# Patient Record
Sex: MALE | Employment: FULL TIME | URBAN - METROPOLITAN AREA
[De-identification: names, ages, dates, MRNs, and addresses within clinical notes are randomized per-mention and may not be internally consistent; named-entity substitution may affect disease eponyms.]

---

## 2020-11-11 ENCOUNTER — TELEPHONE (OUTPATIENT)
Dept: ENDOCRINOLOGY CLINIC | Facility: CLINIC | Age: 45
End: 2020-11-11

## 2020-11-11 ENCOUNTER — OFFICE VISIT (OUTPATIENT)
Dept: ENDOCRINOLOGY CLINIC | Facility: CLINIC | Age: 45
End: 2020-11-11
Payer: COMMERCIAL

## 2020-11-11 VITALS
DIASTOLIC BLOOD PRESSURE: 90 MMHG | HEIGHT: 72 IN | HEART RATE: 74 BPM | BODY MASS INDEX: 31.15 KG/M2 | WEIGHT: 230 LBS | SYSTOLIC BLOOD PRESSURE: 126 MMHG | RESPIRATION RATE: 18 BRPM

## 2020-11-11 DIAGNOSIS — E66.09 CLASS 1 OBESITY DUE TO EXCESS CALORIES WITH SERIOUS COMORBIDITY AND BODY MASS INDEX (BMI) OF 30.0 TO 30.9 IN ADULT: ICD-10-CM

## 2020-11-11 DIAGNOSIS — E11.65 TYPE 2 DIABETES MELLITUS WITH HYPERGLYCEMIA, WITHOUT LONG-TERM CURRENT USE OF INSULIN (HCC): Primary | ICD-10-CM

## 2020-11-11 PROCEDURE — 3074F SYST BP LT 130 MM HG: CPT | Performed by: INTERNAL MEDICINE

## 2020-11-11 PROCEDURE — 83036 HEMOGLOBIN GLYCOSYLATED A1C: CPT | Performed by: INTERNAL MEDICINE

## 2020-11-11 PROCEDURE — 82962 GLUCOSE BLOOD TEST: CPT | Performed by: INTERNAL MEDICINE

## 2020-11-11 PROCEDURE — 3008F BODY MASS INDEX DOCD: CPT | Performed by: INTERNAL MEDICINE

## 2020-11-11 PROCEDURE — 99203 OFFICE O/P NEW LOW 30 MIN: CPT | Performed by: INTERNAL MEDICINE

## 2020-11-11 PROCEDURE — 90686 IIV4 VACC NO PRSV 0.5 ML IM: CPT | Performed by: INTERNAL MEDICINE

## 2020-11-11 PROCEDURE — 99072 ADDL SUPL MATRL&STAF TM PHE: CPT | Performed by: INTERNAL MEDICINE

## 2020-11-11 PROCEDURE — 90471 IMMUNIZATION ADMIN: CPT | Performed by: INTERNAL MEDICINE

## 2020-11-11 PROCEDURE — 3080F DIAST BP >= 90 MM HG: CPT | Performed by: INTERNAL MEDICINE

## 2020-11-11 PROCEDURE — 36416 COLLJ CAPILLARY BLOOD SPEC: CPT | Performed by: INTERNAL MEDICINE

## 2020-11-11 RX ORDER — LANCETS 30 GAUGE
1 EACH MISCELLANEOUS 2 TIMES DAILY
Qty: 1 EACH | Refills: 1 | Status: SHIPPED | OUTPATIENT
Start: 2020-11-11

## 2020-11-11 RX ORDER — BLOOD SUGAR DIAGNOSTIC
STRIP MISCELLANEOUS
Qty: 100 STRIP | Refills: 0 | Status: SHIPPED | OUTPATIENT
Start: 2020-11-11 | End: 2020-12-30

## 2020-11-11 RX ORDER — BLOOD-GLUCOSE METER
1 EACH MISCELLANEOUS 2 TIMES DAILY
Qty: 1 KIT | Refills: 0 | Status: SHIPPED | OUTPATIENT
Start: 2020-11-11

## 2020-11-11 RX ORDER — ATORVASTATIN CALCIUM 10 MG/1
10 TABLET, FILM COATED ORAL NIGHTLY
COMMUNITY
End: 2022-01-23

## 2020-11-11 NOTE — TELEPHONE ENCOUNTER
Please call patient and let him know that there is a referral in his chart to make an appointment with an ophthalmologist. Thank you!

## 2020-11-11 NOTE — H&P
Name: Daryl Zuleta  Date: 11/11/2020    Referring Physician: [address] [place] [address],[address]   Daryl Zuleta is a 39year old male who presents for evaluation and management of uncontrolled T2D.  He was diagnosed on blood work, having p Smoking status: Never Smoker      Smokeless tobacco: Never Used    Alcohol use: Yes      Frequency: Monthly or less      Drinks per session: 1 or 2    Drug use: Never      Medical History:   Past Medical History:   Diagnosis Date   • Diabetes ([facility] of DM.     1.) Hyperglycemia Management- We discussed the importance of glycemic control to prevent complications of diabetes. We discussed the importance of SBGM. I offered and provided patient education materials and offered a blood glucose log book.    -

## 2020-11-11 NOTE — TELEPHONE ENCOUNTER
Pharmacy comments: PLEASE SEND RX FOR ACCU-CHEK SOFTCLIX LANCETS FOR PATIENT ELIZABETH Rendon [id number]. :[id number].

## 2020-11-12 RX ORDER — LANCETS
EACH MISCELLANEOUS
Qty: 100 EACH | Refills: 1 | Status: SHIPPED | OUTPATIENT
Start: 2020-11-12

## 2020-11-13 ENCOUNTER — TELEPHONE (OUTPATIENT)
Dept: ENDOCRINOLOGY CLINIC | Facility: CLINIC | Age: 45
End: 2020-11-13

## 2020-11-13 RX ORDER — BLOOD SUGAR DIAGNOSTIC
STRIP MISCELLANEOUS
Qty: 200 STRIP | Refills: 1 | Status: SHIPPED | OUTPATIENT
Start: 2020-11-13

## 2020-11-13 RX ORDER — LANCETS 33 GAUGE
EACH MISCELLANEOUS
Qty: 200 EACH | Refills: 1 | Status: SHIPPED | OUTPATIENT
Start: 2020-11-13

## 2020-11-13 RX ORDER — BLOOD-GLUCOSE METER
EACH MISCELLANEOUS
Qty: 1 KIT | Refills: 0 | Status: SHIPPED | OUTPATIENT
Start: 2020-11-13

## 2020-11-13 NOTE — TELEPHONE ENCOUNTER
[employer]old[employer] Bender is a certain employer but mail order service is through [employer].   RN substituted brand to the preferred per protocol

## 2020-11-13 NOTE — TELEPHONE ENCOUNTER
Livingston An form [email or web address] states that the scripts   Glucose Blood (ACCU-CHEK GUIDE) In Vitro Strip 100 strip 0 11/11/2020    Sig:   Please check blood sugars twice a day, fasting and before either lunch or dinner     Route:   (none)     Note to Pharmacy

## 2020-11-16 ENCOUNTER — APPOINTMENT (OUTPATIENT)
Dept: LAB | Facility: HOSPITAL | Age: 45
End: 2020-11-16
Attending: INTERNAL MEDICINE
Payer: COMMERCIAL

## 2020-11-16 DIAGNOSIS — E11.65 TYPE 2 DIABETES MELLITUS WITH HYPERGLYCEMIA, WITHOUT LONG-TERM CURRENT USE OF INSULIN (HCC): ICD-10-CM

## 2020-11-16 DIAGNOSIS — E66.09 CLASS 1 OBESITY DUE TO EXCESS CALORIES WITH SERIOUS COMORBIDITY AND BODY MASS INDEX (BMI) OF 30.0 TO 30.9 IN ADULT: ICD-10-CM

## 2020-11-16 PROCEDURE — 84439 ASSAY OF FREE THYROXINE: CPT

## 2020-11-16 PROCEDURE — 82570 ASSAY OF URINE CREATININE: CPT

## 2020-11-16 PROCEDURE — 84443 ASSAY THYROID STIM HORMONE: CPT

## 2020-11-16 PROCEDURE — 82043 UR ALBUMIN QUANTITATIVE: CPT

## 2020-11-16 PROCEDURE — 80053 COMPREHEN METABOLIC PANEL: CPT

## 2020-11-16 PROCEDURE — 80061 LIPID PANEL: CPT

## 2020-11-16 PROCEDURE — 36415 COLL VENOUS BLD VENIPUNCTURE: CPT

## 2020-11-29 ENCOUNTER — PATIENT MESSAGE (OUTPATIENT)
Dept: ENDOCRINOLOGY CLINIC | Facility: CLINIC | Age: 45
End: 2020-11-29

## 2020-11-30 NOTE — TELEPHONE ENCOUNTER
From: Kortney Ocampo  To: Ashlyn rBown MD  Sent: 11/29/2020 9:46 PM CST  Subject: Test Results Question    I have a question about Glycosylated hemoglobin test resulted on 11/11/20 at 10:50 AM.      Doctor,    Can you please email me these resul

## 2020-12-09 ENCOUNTER — PATIENT MESSAGE (OUTPATIENT)
Dept: ENDOCRINOLOGY CLINIC | Facility: CLINIC | Age: 45
End: 2020-12-09

## 2020-12-10 NOTE — TELEPHONE ENCOUNTER
From: Grazyna Mcdowell  To: Ashlyn Moore MD  Sent: 12/9/2020 10:45 AM CST  Subject: Other    Dr. Beulah Gamez if you can send my results to Chantel [email or web address]. [email or web address]       Very Best.

## 2020-12-30 DIAGNOSIS — E11.65 TYPE 2 DIABETES MELLITUS WITH HYPERGLYCEMIA, WITHOUT LONG-TERM CURRENT USE OF INSULIN (HCC): ICD-10-CM

## 2020-12-30 RX ORDER — BLOOD SUGAR DIAGNOSTIC
STRIP MISCELLANEOUS
Qty: 100 STRIP | Refills: 1 | Status: SHIPPED | OUTPATIENT
Start: 2020-12-30

## 2021-04-12 ENCOUNTER — PATIENT MESSAGE (OUTPATIENT)
Dept: ENDOCRINOLOGY CLINIC | Facility: CLINIC | Age: 46
End: 2021-04-12

## 2021-04-13 NOTE — TELEPHONE ENCOUNTER
From: Linnette Jaimes  To: Ashlyn Torres MD  Sent: 4/12/2021 7:16 PM CDT  Subject: Prescription Question    Good evening Doc. Looking to update my new insurance, I have scheduled a new appointment to see you on 5-8-21.   I also need a refill on

## 2021-05-08 ENCOUNTER — OFFICE VISIT (OUTPATIENT)
Dept: ENDOCRINOLOGY CLINIC | Facility: CLINIC | Age: 46
End: 2021-05-08
Payer: COMMERCIAL

## 2021-05-08 VITALS
HEART RATE: 80 BPM | BODY MASS INDEX: 31.69 KG/M2 | WEIGHT: 234 LBS | RESPIRATION RATE: 18 BRPM | DIASTOLIC BLOOD PRESSURE: 86 MMHG | HEIGHT: 72 IN | SYSTOLIC BLOOD PRESSURE: 138 MMHG

## 2021-05-08 DIAGNOSIS — E78.5 HYPERLIPIDEMIA, UNSPECIFIED HYPERLIPIDEMIA TYPE: ICD-10-CM

## 2021-05-08 DIAGNOSIS — E66.09 CLASS 1 OBESITY DUE TO EXCESS CALORIES WITH SERIOUS COMORBIDITY AND BODY MASS INDEX (BMI) OF 30.0 TO 30.9 IN ADULT: ICD-10-CM

## 2021-05-08 DIAGNOSIS — E11.65 TYPE 2 DIABETES MELLITUS WITH HYPERGLYCEMIA, WITHOUT LONG-TERM CURRENT USE OF INSULIN (HCC): Primary | ICD-10-CM

## 2021-05-08 PROCEDURE — 36416 COLLJ CAPILLARY BLOOD SPEC: CPT | Performed by: INTERNAL MEDICINE

## 2021-05-08 PROCEDURE — 99214 OFFICE O/P EST MOD 30 MIN: CPT | Performed by: INTERNAL MEDICINE

## 2021-05-08 PROCEDURE — 83036 HEMOGLOBIN GLYCOSYLATED A1C: CPT | Performed by: INTERNAL MEDICINE

## 2021-05-08 PROCEDURE — 3008F BODY MASS INDEX DOCD: CPT | Performed by: INTERNAL MEDICINE

## 2021-05-08 PROCEDURE — 3075F SYST BP GE 130 - 139MM HG: CPT | Performed by: INTERNAL MEDICINE

## 2021-05-08 PROCEDURE — 82947 ASSAY GLUCOSE BLOOD QUANT: CPT | Performed by: INTERNAL MEDICINE

## 2021-05-08 PROCEDURE — 3079F DIAST BP 80-89 MM HG: CPT | Performed by: INTERNAL MEDICINE

## 2021-05-08 NOTE — PROGRESS NOTES
Name: Quentin Clark  Date: 5/08/21    Referring Physician: Self-referral    INITIAL VISIT:   Quentin Clark is a 39year old male who presents for follow-up of evaluation and management of uncontrolled T2D.  He was diagnosed on blood work, having edith w/Device Does not apply Kit, Check blood sugar twice a day, fasting and before either lunch or dinner, Disp: 1 kit, Rfl: 0  •  Glucose Blood (ONETOUCH VERIO) In Vitro Strip, Check blood sugar twice a day, fasting and before either lunch or dinner, Disp: 20 11/16/2020    ANIONGAP 3 11/16/2020    GFRNAA 95 11/16/2020    GFRAA 110 11/16/2020    CA 8.9 11/16/2020    OSMOCALC 295 11/16/2020    ALKPHO 116 11/16/2020    AST 22 11/16/2020    ALT 37 11/16/2020    BILT 0.6 11/16/2020    TP 7.3 11/16/2020    ALB 3.7 11 continue to be active    Return to clinic in 3 months    Prior to this encounter, I spent over 10 minutes with preparing for the visit, including reviewing documents from other specialties as well as from PCP and going over test results.  During the face to

## 2021-09-28 ENCOUNTER — OFFICE VISIT (OUTPATIENT)
Dept: OPHTHALMOLOGY | Facility: CLINIC | Age: 46
End: 2021-09-28
Payer: COMMERCIAL

## 2021-09-28 DIAGNOSIS — E11.9 TYPE 2 DIABETES MELLITUS WITHOUT RETINOPATHY (HCC): Primary | ICD-10-CM

## 2021-09-28 DIAGNOSIS — H52.13 MYOPIA OF BOTH EYES WITH REGULAR ASTIGMATISM: ICD-10-CM

## 2021-09-28 DIAGNOSIS — H52.223 MYOPIA OF BOTH EYES WITH REGULAR ASTIGMATISM: ICD-10-CM

## 2021-09-28 PROCEDURE — 92015 DETERMINE REFRACTIVE STATE: CPT | Performed by: OPHTHALMOLOGY

## 2021-09-28 PROCEDURE — 92004 COMPRE OPH EXAM NEW PT 1/>: CPT | Performed by: OPHTHALMOLOGY

## 2021-09-28 RX ORDER — METFORMIN HYDROCHLORIDE 500 MG/1
2 TABLET, EXTENDED RELEASE ORAL 2 TIMES DAILY
COMMUNITY
Start: 2019-12-13 | End: 2021-10-13

## 2021-09-28 NOTE — PROGRESS NOTES
Viviana Valdivia is a 42year old male.     HPI:     HPI     Consult      Additional comments: Consult per Dr. Diana Gunderson              Diabetic Eye Exam      Additional comments: Pt has been a diabetic for 2 1/2 years       Pt's diabetes is currently contr and before either lunch or dinner 200 strip 1   • OneTouch Delica Lancets 24A Does not apply Misc Check blood sugar twice a day, fasting and before either lunch or dinner 200 each 1   • Accu-Chek Softclix Lancets Does not apply Misc Use to check blood suga Vitreous Clear Clear          Fundus Exam       Right Left    Disc Good rim, Temporal crescent Good rim, Temporal crescent    C/D Ratio 0.35 0.5    Macula Normal- no BDR Normal- no BDR    Vessels Normal Normal    Periphery Normal Normal            Refra

## 2021-09-28 NOTE — PATIENT INSTRUCTIONS
Myopia of both eyes with regular astigmatism  New glasses today; update as needed. Discussed that new prescription is only a slight change.        Type 2 diabetes mellitus without retinopathy ([address]  Diabetes type II: no background of retinopathy, no signs

## 2021-10-09 NOTE — TELEPHONE ENCOUNTER
From: Viviana Valdivia  To: Ashlyn Gunderson MD  Sent: 12/9/2020 10:09 AM CST  Subject: Other    Dr.    How can I get my test results printed, I need them for a new job posting.  I received an email from a nurse telling me to go into a portal but did n
rn sent mychart letting him know to open [employer] thru a laptop , desktop to try pinting or take a picture of result and print that.
Left arm;

## 2021-10-13 ENCOUNTER — PATIENT MESSAGE (OUTPATIENT)
Dept: ENDOCRINOLOGY CLINIC | Facility: CLINIC | Age: 46
End: 2021-10-13

## 2021-10-25 ENCOUNTER — PATIENT MESSAGE (OUTPATIENT)
Dept: ENDOCRINOLOGY CLINIC | Facility: CLINIC | Age: 46
End: 2021-10-25

## 2021-10-25 NOTE — TELEPHONE ENCOUNTER
From: Katharina Negron  To: Ashlyn Sterling MD  Sent: 10/25/2021 9:25 AM CDT  Subject: Prescription    Doc,  I believe they sent the last medication to the wrong pharmacy, can you please see that all medication be sent over to [facility] on [address]

## 2021-10-26 ENCOUNTER — LAB ENCOUNTER (OUTPATIENT)
Dept: LAB | Facility: HOSPITAL | Age: 46
End: 2021-10-26
Attending: INTERNAL MEDICINE
Payer: COMMERCIAL

## 2021-10-26 DIAGNOSIS — E11.65 TYPE 2 DIABETES MELLITUS WITH HYPERGLYCEMIA, WITHOUT LONG-TERM CURRENT USE OF INSULIN (HCC): ICD-10-CM

## 2021-10-26 PROCEDURE — 80053 COMPREHEN METABOLIC PANEL: CPT

## 2021-10-26 PROCEDURE — 80061 LIPID PANEL: CPT

## 2021-10-26 PROCEDURE — 36415 COLL VENOUS BLD VENIPUNCTURE: CPT

## 2021-10-26 PROCEDURE — 82043 UR ALBUMIN QUANTITATIVE: CPT

## 2021-10-26 PROCEDURE — 82570 ASSAY OF URINE CREATININE: CPT

## 2021-10-27 ENCOUNTER — PATIENT MESSAGE (OUTPATIENT)
Dept: ENDOCRINOLOGY CLINIC | Facility: CLINIC | Age: 46
End: 2021-10-27

## 2021-10-27 DIAGNOSIS — E11.65 TYPE 2 DIABETES MELLITUS WITH HYPERGLYCEMIA, WITHOUT LONG-TERM CURRENT USE OF INSULIN (HCC): Primary | ICD-10-CM

## 2021-10-27 DIAGNOSIS — E78.5 HYPERLIPIDEMIA, UNSPECIFIED HYPERLIPIDEMIA TYPE: ICD-10-CM

## 2021-10-28 NOTE — TELEPHONE ENCOUNTER
From: Garry Kellogg  To: Ashlyn Mejias MD  Sent: 10/27/2021 6:13 PM CDT  Subject: Hi Doctor Destiny Mejias,     Just wanted to know your thoughts on my numbers, I see my best down fall has been my Cholesterol numbers.  What should I do to help normal

## 2021-11-04 NOTE — TELEPHONE ENCOUNTER
Hi!    Can we please ask Mr. Orville Bajwa if he was fasting when he had his tests done? Please let him know that this affects his number greatly.   Cutting down on fatty foods as well as processed sugars will really help to improve his lipid profile as well as

## 2022-01-15 ENCOUNTER — LAB ENCOUNTER (OUTPATIENT)
Dept: LAB | Facility: HOSPITAL | Age: 47
End: 2022-01-15
Attending: INTERNAL MEDICINE
Payer: COMMERCIAL

## 2022-01-15 ENCOUNTER — OFFICE VISIT (OUTPATIENT)
Dept: ENDOCRINOLOGY CLINIC | Facility: CLINIC | Age: 47
End: 2022-01-15
Payer: COMMERCIAL

## 2022-01-15 VITALS
HEIGHT: 72 IN | RESPIRATION RATE: 18 BRPM | DIASTOLIC BLOOD PRESSURE: 85 MMHG | HEART RATE: 81 BPM | WEIGHT: 232 LBS | BODY MASS INDEX: 31.42 KG/M2 | SYSTOLIC BLOOD PRESSURE: 131 MMHG

## 2022-01-15 DIAGNOSIS — E78.5 HYPERLIPIDEMIA, UNSPECIFIED HYPERLIPIDEMIA TYPE: ICD-10-CM

## 2022-01-15 DIAGNOSIS — G62.9 NEUROPATHY: ICD-10-CM

## 2022-01-15 DIAGNOSIS — E11.65 TYPE 2 DIABETES MELLITUS WITH HYPERGLYCEMIA, WITHOUT LONG-TERM CURRENT USE OF INSULIN (HCC): ICD-10-CM

## 2022-01-15 DIAGNOSIS — E11.65 TYPE 2 DIABETES MELLITUS WITH HYPERGLYCEMIA, WITHOUT LONG-TERM CURRENT USE OF INSULIN (HCC): Primary | ICD-10-CM

## 2022-01-15 DIAGNOSIS — E66.09 CLASS 1 OBESITY DUE TO EXCESS CALORIES WITH SERIOUS COMORBIDITY AND BODY MASS INDEX (BMI) OF 31.0 TO 31.9 IN ADULT: ICD-10-CM

## 2022-01-15 LAB
ALBUMIN SERPL-MCNC: 3.9 G/DL (ref 3.4–5)
ALBUMIN/GLOB SERPL: 1.1 {RATIO} (ref 1–2)
ALP LIVER SERPL-CCNC: 87 U/L
ALT SERPL-CCNC: 49 U/L
ANION GAP SERPL CALC-SCNC: 4 MMOL/L (ref 0–18)
AST SERPL-CCNC: 24 U/L (ref 15–37)
BILIRUB SERPL-MCNC: 0.6 MG/DL (ref 0.1–2)
BUN BLD-MCNC: 10 MG/DL (ref 7–18)
BUN/CREAT SERPL: 9.6 (ref 10–20)
CALCIUM BLD-MCNC: 9.2 MG/DL (ref 8.5–10.1)
CARTRIDGE LOT#: ABNORMAL NUMERIC
CHLORIDE SERPL-SCNC: 106 MMOL/L (ref 98–112)
CHOLEST SERPL-MCNC: 159 MG/DL (ref ?–200)
CO2 SERPL-SCNC: 29 MMOL/L (ref 21–32)
CREAT BLD-MCNC: 1.04 MG/DL
CREAT UR-SCNC: 172 MG/DL
FASTING PATIENT LIPID ANSWER: YES
FASTING STATUS PATIENT QL REPORTED: YES
GLOBULIN PLAS-MCNC: 3.4 G/DL (ref 2.8–4.4)
GLUCOSE BLD-MCNC: 147 MG/DL (ref 70–99)
GLUCOSE BLOOD: 170
HDLC SERPL-MCNC: 34 MG/DL (ref 40–59)
HEMOGLOBIN A1C: 6.7 % (ref 4.3–5.6)
LDLC SERPL CALC-MCNC: 101 MG/DL (ref ?–100)
MICROALBUMIN UR-MCNC: 0.59 MG/DL
MICROALBUMIN/CREAT 24H UR-RTO: 3.4 UG/MG (ref ?–30)
NONHDLC SERPL-MCNC: 125 MG/DL (ref ?–130)
OSMOLALITY SERPL CALC.SUM OF ELEC: 290 MOSM/KG (ref 275–295)
POTASSIUM SERPL-SCNC: 4.3 MMOL/L (ref 3.5–5.1)
PROT SERPL-MCNC: 7.3 G/DL (ref 6.4–8.2)
SODIUM SERPL-SCNC: 139 MMOL/L (ref 136–145)
T4 FREE SERPL-MCNC: 1.1 NG/DL (ref 0.8–1.7)
TEST STRIP LOT #: NORMAL NUMERIC
TRIGL SERPL-MCNC: 131 MG/DL (ref 30–149)
TSI SER-ACNC: 1.55 MIU/ML (ref 0.36–3.74)
VIT B12 SERPL-MCNC: 376 PG/ML (ref 193–986)
VIT D+METAB SERPL-MCNC: 14.7 NG/ML (ref 30–100)
VLDLC SERPL CALC-MCNC: 22 MG/DL (ref 0–30)

## 2022-01-15 PROCEDURE — 3079F DIAST BP 80-89 MM HG: CPT | Performed by: INTERNAL MEDICINE

## 2022-01-15 PROCEDURE — 99214 OFFICE O/P EST MOD 30 MIN: CPT | Performed by: INTERNAL MEDICINE

## 2022-01-15 PROCEDURE — 36416 COLLJ CAPILLARY BLOOD SPEC: CPT | Performed by: INTERNAL MEDICINE

## 2022-01-15 PROCEDURE — 83036 HEMOGLOBIN GLYCOSYLATED A1C: CPT | Performed by: INTERNAL MEDICINE

## 2022-01-15 PROCEDURE — 3075F SYST BP GE 130 - 139MM HG: CPT | Performed by: INTERNAL MEDICINE

## 2022-01-15 PROCEDURE — 82947 ASSAY GLUCOSE BLOOD QUANT: CPT | Performed by: INTERNAL MEDICINE

## 2022-01-15 PROCEDURE — 80053 COMPREHEN METABOLIC PANEL: CPT

## 2022-01-15 PROCEDURE — 84443 ASSAY THYROID STIM HORMONE: CPT

## 2022-01-15 PROCEDURE — 82607 VITAMIN B-12: CPT

## 2022-01-15 PROCEDURE — 3044F HG A1C LEVEL LT 7.0%: CPT | Performed by: INTERNAL MEDICINE

## 2022-01-15 PROCEDURE — 36415 COLL VENOUS BLD VENIPUNCTURE: CPT

## 2022-01-15 PROCEDURE — 82570 ASSAY OF URINE CREATININE: CPT

## 2022-01-15 PROCEDURE — 3008F BODY MASS INDEX DOCD: CPT | Performed by: INTERNAL MEDICINE

## 2022-01-15 PROCEDURE — 82043 UR ALBUMIN QUANTITATIVE: CPT

## 2022-01-15 PROCEDURE — 84439 ASSAY OF FREE THYROXINE: CPT

## 2022-01-15 PROCEDURE — 82306 VITAMIN D 25 HYDROXY: CPT

## 2022-01-15 PROCEDURE — 80061 LIPID PANEL: CPT

## 2022-01-15 RX ORDER — LANCETS 33 GAUGE
1 EACH MISCELLANEOUS 3 TIMES DAILY
Qty: 300 EACH | Refills: 0 | Status: CANCELLED | OUTPATIENT
Start: 2022-01-15

## 2022-01-15 RX ORDER — BLOOD-GLUCOSE METER
EACH MISCELLANEOUS
Qty: 1 KIT | Refills: 0 | Status: CANCELLED | OUTPATIENT
Start: 2022-01-15

## 2022-01-15 RX ORDER — BLOOD SUGAR DIAGNOSTIC
STRIP MISCELLANEOUS
Qty: 300 EACH | Refills: 0 | Status: CANCELLED | OUTPATIENT
Start: 2022-01-15

## 2022-01-15 RX ORDER — FLASH GLUCOSE SENSOR
1 KIT MISCELLANEOUS
Qty: 6 EACH | Refills: 0 | OUTPATIENT
Start: 2022-01-15

## 2022-01-15 NOTE — PROGRESS NOTES
Name: Adams Wilson  Date: 1/15/22    Referring Physician: Self-referral    INITIAL VISIT:   Adams Wilson is a 42year old male who presents for follow-up of evaluation and management of uncontrolled T2D.  He was diagnosed on blood work, having edith 2 TABLETS (1,000 MG TOTAL) BY MOUTH 2 (TWO) TIMES DAILY WITH MEALS., Disp: 360 tablet, Rfl: 0  •  Glucose Blood (ACCU-CHEK GUIDE) In Vitro Strip, PLEASE CHECK BLOOD SUGARS TWICE DAILY, FASTING AND BEFORE EITHER LUNCH OR DINNER, Disp: 100 strip, Rfl: 1  • sclera. , normal sclera and normal pupils  Psychiatric:  oriented to time, self, and place  Nutritional:  no abnormal weight gain or loss    Lab Data: None  Lab Results   Component Value Date    A1C 8.0 (A) 05/08/2021     Lab Results   Component Value Date after recheck, not on meds  - Lipid panel- will check now  - MAC- will check now  - CMP- will check now  - Neuropathy- he has numbness from time to time, will check vitamin D, TSH, FT4, vitamin B12    3.) Lifestyle Management for Diabetes- We discussed imp

## 2022-01-23 ENCOUNTER — TELEPHONE (OUTPATIENT)
Dept: ENDOCRINOLOGY CLINIC | Facility: CLINIC | Age: 47
End: 2022-01-23

## 2022-01-23 DIAGNOSIS — E78.5 HYPERLIPIDEMIA, UNSPECIFIED HYPERLIPIDEMIA TYPE: ICD-10-CM

## 2022-01-23 DIAGNOSIS — E55.9 VITAMIN D DEFICIENCY: Primary | ICD-10-CM

## 2022-01-23 RX ORDER — ERGOCALCIFEROL 1.25 MG/1
50000 CAPSULE ORAL WEEKLY
Qty: 12 CAPSULE | Refills: 0 | Status: SHIPPED | OUTPATIENT
Start: 2022-01-23 | End: 2022-04-23

## 2022-01-23 RX ORDER — ATORVASTATIN CALCIUM 20 MG/1
20 TABLET, FILM COATED ORAL NIGHTLY
Qty: 90 TABLET | Refills: 0 | Status: SHIPPED | OUTPATIENT
Start: 2022-01-23 | End: 2022-04-23

## 2022-01-24 NOTE — TELEPHONE ENCOUNTER
Hi!    Can we please call this patient and let him know that I have reviewed his blood and urine tests. His vitamin D level is low and I would like to start him on a high dose vitamin D for three months and recheck it at that time. His vitamin B12 is on the low side and may account for the numbness he sometimes experiences. I would like him to start on vitamin B12 500mcg daily. I would like to increase his atorvastatin from 10 to 20mg PO at bedtime. He as no evidence of damage to the kidneys due to diabetes. Thank you. We will recheck labs at his next appt.

## 2022-04-16 RX ORDER — SITAGLIPTIN 100 MG/1
TABLET, FILM COATED ORAL
Qty: 90 TABLET | Refills: 0 | Status: SHIPPED | OUTPATIENT
Start: 2022-04-16

## 2022-04-16 NOTE — TELEPHONE ENCOUNTER
LOV: 1/15/22    RTC: 6 Months    FU: No FU Appt Scheduled    3 Month Supply Pending I will SWITCH the dose or number of times a day I take the medications listed below when I get home from the hospital:  None

## (undated) DIAGNOSIS — E11.65 TYPE 2 DIABETES MELLITUS WITH HYPERGLYCEMIA, WITHOUT LONG-TERM CURRENT USE OF INSULIN (HCC): ICD-10-CM

## (undated) NOTE — LETTER
September 28, 2021    No Lundy MD  [address] [address]     Patient: Brian Oliveira   YOB: 1975   Date of Visit: 9/28/2021       Dear Dr. Devyn Levy MD:    Thank you for referring Brian Oliveira DAILY, FASTING AND BEFORE EITHER LUNCH OR DINNER 100 strip 1   • Blood Glucose Monitoring Suppl ([address]) w/Device Does not apply Kit Check blood sugar twice a day, fasting and before either lunch or dinner 1 kit 0   • Glucose Blood (ONE Slit Lamp and Fundus Exam     Slit Lamp Exam       Right Left    Lids/Lashes Dermatochalasis, Meibomian gland dysfunction Dermatochalasis, Meibomian gland dysfunction    Conjunctiva/Sclera Normal Normal    Cornea Clear Clear    Anterior Chamber Jennifer Ponce electronically generated by: Carine Corley MD